# Patient Record
Sex: MALE | URBAN - METROPOLITAN AREA
[De-identification: names, ages, dates, MRNs, and addresses within clinical notes are randomized per-mention and may not be internally consistent; named-entity substitution may affect disease eponyms.]

---

## 2022-01-10 ENCOUNTER — APPOINTMENT (RX ONLY)
Dept: URBAN - METROPOLITAN AREA CLINIC 158 | Facility: CLINIC | Age: 27
Setting detail: DERMATOLOGY
End: 2022-01-10

## 2022-01-10 DIAGNOSIS — L30.8 OTHER SPECIFIED DERMATITIS: ICD-10-CM

## 2022-01-10 PROBLEM — L30.9 DERMATITIS, UNSPECIFIED: Status: ACTIVE | Noted: 2022-01-10

## 2022-01-10 PROCEDURE — ? COUNSELING

## 2022-01-10 PROCEDURE — ? PRESCRIPTION

## 2022-01-10 PROCEDURE — 99203 OFFICE O/P NEW LOW 30 MIN: CPT

## 2022-01-10 PROCEDURE — ? TREATMENT REGIMEN

## 2022-01-10 PROCEDURE — ? DEFER

## 2022-01-10 RX ORDER — TRIAMCINOLONE ACETONIDE 1 MG/G
1 CREAM TOPICAL TWICE A DAY
Qty: 30 | Refills: 0 | Status: ERX | COMMUNITY
Start: 2022-01-10

## 2022-01-10 RX ADMIN — TRIAMCINOLONE ACETONIDE 1: 1 CREAM TOPICAL at 00:00

## 2022-01-10 ASSESSMENT — LOCATION DETAILED DESCRIPTION DERM
LOCATION DETAILED: LEFT DISTAL PALMAR INDEX FINGER
LOCATION DETAILED: LEFT RADIAL PALM
LOCATION DETAILED: LEFT PROXIMAL PALMAR MIDDLE FINGER
LOCATION DETAILED: RIGHT PROXIMAL PALMAR INDEX FINGER

## 2022-01-10 ASSESSMENT — LOCATION ZONE DERM
LOCATION ZONE: HAND
LOCATION ZONE: FINGER

## 2022-01-10 ASSESSMENT — LOCATION SIMPLE DESCRIPTION DERM
LOCATION SIMPLE: LEFT HAND
LOCATION SIMPLE: LEFT INDEX FINGER
LOCATION SIMPLE: LEFT MIDDLE FINGER
LOCATION SIMPLE: RIGHT INDEX FINGER

## 2022-01-10 NOTE — PROCEDURE: TREATMENT REGIMEN
Plan: Hx: atopic dermatitis on the medial thighs in middle school; next right single finger dermatitis while still a teen into adulthood, now, for 3 months, finger dermatitis.\\nLinks it to change in water in restroom at his office atwork.\\nExposures: works out at the gym and does use wipes to wipe equipment. When cleaning at home, wears gloves. New kitten 3 months ago. , wife .\\nHealthy; weight gain 15 to 20 pounds in last 4 years.\\nHas never been patch tested.\\n\\nDDx atopic dermatitis vs allergic contact dermatitis +/- irritant contact derm associated with caring for new kitten in the last 3 months. \\n\\nTx: TAC cream and hand cream after hand washes. May take own soap to work and may spray gym equipment with rubbing alcohol and wipe with terrycloth towel  all to avoid potential allergens
Detail Level: Simple

## 2022-05-19 NOTE — PROCEDURE: DEFER
Procedure To Be Performed At Next Visit: Patch testing
Introduction Text (Please End With A Colon): :
Detail Level: Simple
97.2